# Patient Record
Sex: FEMALE | Race: WHITE | NOT HISPANIC OR LATINO | URBAN - METROPOLITAN AREA
[De-identification: names, ages, dates, MRNs, and addresses within clinical notes are randomized per-mention and may not be internally consistent; named-entity substitution may affect disease eponyms.]

---

## 2019-02-06 ENCOUNTER — TELEPHONE (OUTPATIENT)
Dept: ENDOCRINOLOGY | Facility: CLINIC | Age: 49
End: 2019-02-06

## 2019-02-06 NOTE — TELEPHONE ENCOUNTER
Spoke with pt and advised pt to establish care with a provider in [facility] in order to get referral for endo

## 2019-02-06 NOTE — TELEPHONE ENCOUNTER
----- Message from Tracy Padilla sent at 2/6/2019  9:17 AM CST -----  Contact: pt  She's calling in  Regards to NP appt pls call pt back at [phone]

## 2019-04-26 ENCOUNTER — OFFICE VISIT (OUTPATIENT)
Dept: ENDOCRINOLOGY | Facility: CLINIC | Age: 49
End: 2019-04-26
Payer: COMMERCIAL

## 2019-04-26 VITALS
HEIGHT: 66 IN | BODY MASS INDEX: 26.22 KG/M2 | SYSTOLIC BLOOD PRESSURE: 132 MMHG | WEIGHT: 163.13 LBS | DIASTOLIC BLOOD PRESSURE: 78 MMHG | HEART RATE: 89 BPM

## 2019-04-26 DIAGNOSIS — R73.01 IMPAIRED FASTING GLUCOSE: Primary | ICD-10-CM

## 2019-04-26 DIAGNOSIS — E78.1 HYPERTRIGLYCERIDEMIA: ICD-10-CM

## 2019-04-26 DIAGNOSIS — R63.5 WEIGHT GAIN: ICD-10-CM

## 2019-04-26 PROCEDURE — 99999 PR PBB SHADOW E&M-EST. PATIENT-LVL III: CPT | Mod: PBBFAC,,, | Performed by: INTERNAL MEDICINE

## 2019-04-26 PROCEDURE — 3008F PR BODY MASS INDEX (BMI) DOCUMENTED: ICD-10-PCS | Mod: CPTII,S$GLB,, | Performed by: INTERNAL MEDICINE

## 2019-04-26 PROCEDURE — 99999 PR PBB SHADOW E&M-EST. PATIENT-LVL III: ICD-10-PCS | Mod: PBBFAC,,, | Performed by: INTERNAL MEDICINE

## 2019-04-26 PROCEDURE — 3008F BODY MASS INDEX DOCD: CPT | Mod: CPTII,S$GLB,, | Performed by: INTERNAL MEDICINE

## 2019-04-26 PROCEDURE — 99204 PR OFFICE/OUTPT VISIT, NEW, LEVL IV, 45-59 MIN: ICD-10-PCS | Mod: S$GLB,,, | Performed by: INTERNAL MEDICINE

## 2019-04-26 PROCEDURE — 99204 OFFICE O/P NEW MOD 45 MIN: CPT | Mod: S$GLB,,, | Performed by: INTERNAL MEDICINE

## 2019-04-26 RX ORDER — OMEGA-3 FATTY ACIDS 1000 MG
2 CAPSULE ORAL
COMMUNITY

## 2019-04-26 RX ORDER — METFORMIN HYDROCHLORIDE 500 MG/1
TABLET ORAL
Qty: 30 TABLET | Refills: 1 | Status: SHIPPED | OUTPATIENT
Start: 2019-04-26 | End: 2019-05-16 | Stop reason: SDUPTHER

## 2019-04-26 RX ORDER — MULTIVITAMIN
1 TABLET ORAL
COMMUNITY

## 2019-04-26 RX ORDER — OFLOXACIN 3 MG/ML
SOLUTION AURICULAR (OTIC)
COMMUNITY
Start: 2019-03-11

## 2019-04-26 RX ORDER — PHENTERMINE AND TOPIRAMATE 7.5; 46 MG/1; MG/1
CAPSULE, EXTENDED RELEASE ORAL
Refills: 3 | COMMUNITY
Start: 2019-04-01

## 2019-04-26 RX ORDER — ATORVASTATIN CALCIUM 20 MG/1
TABLET, FILM COATED ORAL
COMMUNITY
Start: 2019-03-19

## 2019-04-26 RX ORDER — PEN NEEDLE, DIABETIC 29 G X1/2"
NEEDLE, DISPOSABLE MISCELLANEOUS
Qty: 50 EACH | Refills: 3 | Status: SHIPPED | OUTPATIENT
Start: 2019-04-26 | End: 2019-08-16 | Stop reason: SDUPTHER

## 2019-04-26 RX ORDER — NYSTATIN AND TRIAMCINOLONE ACETONIDE 100000; 1 [USP'U]/G; MG/G
OINTMENT TOPICAL
COMMUNITY
Start: 2019-03-11

## 2019-04-26 RX ORDER — LEVOTHYROXINE SODIUM 25 UG/1
25 TABLET ORAL DAILY
Qty: 30 TABLET | Refills: 2 | Status: SHIPPED | OUTPATIENT
Start: 2019-04-26 | End: 2019-05-16 | Stop reason: SDUPTHER

## 2019-04-26 RX ORDER — BUPROPION HYDROCHLORIDE 300 MG/1
TABLET ORAL
COMMUNITY
End: 2019-08-16

## 2019-04-26 NOTE — PROGRESS NOTES
PCP:  Devaughn Torre MD    CC:  Weight problem  HPI:  Von Fink 49 y.o. female  Patient was in the past on Synthroid 50 mcg until 6 months ago and she was treated   With Bydureon injection when she was under the care of an endocrinologist .  Last visit was about 6 months ago so patient has been of her medication, Synthroid for about 6 months.  Her weight in the past was 240 lb then her weight was down to 130-140 lb per patient,  and today's the weight is 163 lb.  Patient has been feeling tired.  She visited her cardiologist recently because of the family history of diabetes, and cardiac bypass surgery.  She was prescribed Qsymia about 4 weeks ago and she was advised to follow up with endocrine per patient.  Patient has a history of tubal ligation and she is on birth control pills.  No pregnancy test done recently.  Patient is very eager to lose more weight.  Blood test was done on .  Blood test showed fasting glucose 107, and free T4 of 0.9      Past Medical History:   Diagnosis Date    Chest pain     PND (paroxysmal nocturnal dyspnea)     SOB (shortness of breath)        Past Surgical History:   Procedure Laterality Date     SECTION, CLASSIC  [id number]       Social History     Socioeconomic History    Marital status:      Spouse name: Not on file    Number of children: Not on file    Years of education: Not on file    Highest education level: Not on file   Occupational History    Not on file   Social Needs    Financial resource strain: Not on file    Food insecurity:     Worry: Not on file     Inability: Not on file    Transportation needs:     Medical: Not on file     Non-medical: Not on file   Tobacco Use    Smoking status: Never Smoker   Substance and Sexual Activity    Alcohol use: Yes     Comment: occasional    Drug use: No    Sexual activity: Yes     Partners: Male   Lifestyle    Physical activity:     Days per week: Not on file     Minutes per  session: Not on file    Stress: Not on file   Relationships    Social connections:     Talks on phone: Not on file     Gets together: Not on file     Attends [employer] service: Not on file     Active member of club or organization: Not on file     Attends meetings of clubs or organizations: Not on file     Relationship status: Not on file   Other Topics Concern    Not on file   Social History Narrative    Not on file         ROS:   History of insulin resistance  Weight gain  No diabetes  Status post tubal ligation  ROS otherwise neg except for what is mentioned in the PMH, PSH and HPI    PE:  Vitals:    04/26/19 1601   BP: 132/78   Pulse: 89     Alert and oriented  No acute distress  No cushingoid features in face  No acne  No Proptosis or conjunctivitis  No rash on tongue, +teeth  No goitre by inspection  Thyroid gland is not palpable  No cervical lymphadenopathy  Heart reg, no gallop  Lungs cta, no wheezing  Abd soft, no tnd  No edema in lower legs  No rash  No bruises  Speech normal  Behavior normal  No tremor  Mild Abd Obesity  Body mass index is 26.33 kg/m².      Lab:    Lab Results   Component Value Date    TSH 1.72 04/01/2019    [id number] 7.2 04/01/2019       No components found for: AGBA1C  Lab Results   Component Value Date    CHOL 171 04/01/2019    TRIG 151 (H) 04/01/2019    HDL 72 04/01/2019    CHOLHDL 2.4 04/01/2019    NONHDLCHOL 99 04/01/2019     BMP  Lab Results   Component Value Date     04/01/2019    K 4.4 04/01/2019     04/01/2019    CO2 25 04/01/2019    BUN 10 04/01/2019    CREATININE 0.61 04/01/2019    CALCIUM 8.9 04/01/2019    ANIONGAP 7 (L) 03/06/2012    ESTGFRAFRICA 124 04/01/2019    EGFRNONAA 107 04/01/2019     No results found for: CREATRANDUR, MICALBCREAT    A/P:  Impaired fasting glucose  Insulin resistance  Hypertriglyceridemia  Weight gain  Fatigue  Free T4 was 0.9  Significant family history of cardiac bypass surgery and family history of diabetes  History of treatment with  "Synthroid 50 mcg daily till 6 months ago  History of treatment with Bydureon injection till about 6 months ago    I have discussed with the patient regarding weight management, Qsymia side effects, precautions and the need for monthly pregnancy test.  Contraindications and warning for Saxenda discussed.  For now patient to stop taking Qsymia, and she will be prescribed   Saxenda with starting dose of 0.6 mg daily.  There is no history of pancreatitis or medullary thyroid cancer in the family.  Levothyroxine 25 mcg tablet daily will be prescribed because patient got used to be on 50 mcg dose then she gained weight recently.  Metformin 500 mg tablet daily if tolerated.    -     levothyroxine (SYNTHROID) 25 MCG tablet; Take 1 tablet (25 mcg total) by mouth once daily.  Dispense: 30 tablet; Refill: 2  -     liraglutide (SAXENDA) 3 mg/0.5 mL (18 mg/3 mL) PnIj; 0.6 mg sq daily x 2 weeks then 1.2 mg qd  Dispense: 2 Syringe; Refill: 1  -     pen needle, diabetic 31 gauge x 1/3" Ndle; daily  Dispense: 50 each; Refill: 3  -     metFORMIN (GLUCOPHAGE) 500 MG tablet; qd  Dispense: 30 tablet; Refill: 1      Appt in 6 weeks.    Pt understands the plan and instructions.            "

## 2019-04-30 ENCOUNTER — PATIENT MESSAGE (OUTPATIENT)
Dept: ENDOCRINOLOGY | Facility: CLINIC | Age: 49
End: 2019-04-30

## 2019-05-01 ENCOUNTER — PATIENT MESSAGE (OUTPATIENT)
Dept: ENDOCRINOLOGY | Facility: CLINIC | Age: 49
End: 2019-05-01

## 2019-05-13 ENCOUNTER — PATIENT MESSAGE (OUTPATIENT)
Dept: ENDOCRINOLOGY | Facility: CLINIC | Age: 49
End: 2019-05-13

## 2019-05-16 RX ORDER — LEVOTHYROXINE SODIUM 25 UG/1
25 TABLET ORAL DAILY
Qty: 30 TABLET | Refills: 0 | Status: SHIPPED | OUTPATIENT
Start: 2019-05-16 | End: 2019-05-23

## 2019-05-16 RX ORDER — METFORMIN HYDROCHLORIDE 500 MG/1
TABLET ORAL
Qty: 30 TABLET | Refills: 0 | Status: SHIPPED | OUTPATIENT
Start: 2019-05-16 | End: 2019-05-23 | Stop reason: SDUPTHER

## 2019-05-23 RX ORDER — LEVOTHYROXINE SODIUM 50 UG/1
50 TABLET ORAL DAILY
Qty: 30 TABLET | Refills: 1 | Status: SHIPPED | OUTPATIENT
Start: 2019-05-23 | End: 2019-06-14 | Stop reason: SDUPTHER

## 2019-05-23 RX ORDER — METFORMIN HYDROCHLORIDE 500 MG/1
TABLET ORAL
Qty: 60 TABLET | Refills: 1 | Status: SHIPPED | OUTPATIENT
Start: 2019-05-23 | End: 2019-06-14

## 2019-06-14 ENCOUNTER — OFFICE VISIT (OUTPATIENT)
Dept: ENDOCRINOLOGY | Facility: CLINIC | Age: 49
End: 2019-06-14
Payer: COMMERCIAL

## 2019-06-14 VITALS
BODY MASS INDEX: 26.82 KG/M2 | WEIGHT: 166.88 LBS | SYSTOLIC BLOOD PRESSURE: 120 MMHG | TEMPERATURE: 98 F | DIASTOLIC BLOOD PRESSURE: 68 MMHG | HEIGHT: 66 IN | HEART RATE: 84 BPM

## 2019-06-14 DIAGNOSIS — R73.01 IMPAIRED FASTING GLUCOSE: Primary | ICD-10-CM

## 2019-06-14 DIAGNOSIS — R63.5 WEIGHT GAIN: ICD-10-CM

## 2019-06-14 PROCEDURE — 3008F BODY MASS INDEX DOCD: CPT | Mod: CPTII,S$GLB,, | Performed by: INTERNAL MEDICINE

## 2019-06-14 PROCEDURE — 3008F PR BODY MASS INDEX (BMI) DOCUMENTED: ICD-10-PCS | Mod: CPTII,S$GLB,, | Performed by: INTERNAL MEDICINE

## 2019-06-14 PROCEDURE — 99999 PR PBB SHADOW E&M-EST. PATIENT-LVL III: ICD-10-PCS | Mod: PBBFAC,,, | Performed by: INTERNAL MEDICINE

## 2019-06-14 PROCEDURE — 99214 PR OFFICE/OUTPT VISIT, EST, LEVL IV, 30-39 MIN: ICD-10-PCS | Mod: S$GLB,,, | Performed by: INTERNAL MEDICINE

## 2019-06-14 PROCEDURE — 99999 PR PBB SHADOW E&M-EST. PATIENT-LVL III: CPT | Mod: PBBFAC,,, | Performed by: INTERNAL MEDICINE

## 2019-06-14 PROCEDURE — 99214 OFFICE O/P EST MOD 30 MIN: CPT | Mod: S$GLB,,, | Performed by: INTERNAL MEDICINE

## 2019-06-14 RX ORDER — METFORMIN HYDROCHLORIDE 500 MG/1
TABLET ORAL
Qty: 30 TABLET | Refills: 1 | Status: SHIPPED | OUTPATIENT
Start: 2019-06-14 | End: 2019-06-14 | Stop reason: SDUPTHER

## 2019-06-14 RX ORDER — LEVOTHYROXINE SODIUM 50 UG/1
50 TABLET ORAL DAILY
Qty: 30 TABLET | Refills: 3 | Status: SHIPPED | OUTPATIENT
Start: 2019-06-14 | End: 2020-06-13

## 2019-06-14 RX ORDER — LEVOTHYROXINE SODIUM 50 UG/1
50 TABLET ORAL DAILY
Qty: 30 TABLET | Refills: 2 | Status: SHIPPED | OUTPATIENT
Start: 2019-06-14 | End: 2019-06-14

## 2019-06-14 RX ORDER — METFORMIN HYDROCHLORIDE 500 MG/1
TABLET ORAL
Qty: 60 TABLET | Refills: 2 | Status: SHIPPED | OUTPATIENT
Start: 2019-06-14

## 2019-06-14 NOTE — PROGRESS NOTES
PCP:  Devaughn Torre MD    CC:  Weight problem    HPI:  Von Fink 49 y.o. female  Patient was in the past on Synthroid 50 mcg until 6 months ago and she was treated   with Bydureon injection when she was under the care of an endocrinologist .  Patient was prescribed metformin 500 mg tablet to take once a day, and levothyroxine 25 mcg then the dose was increased to 50 mcg,  And Victoza.  Patient has been taking the prescribed medications.  No upset stomach or nausea or vomiting from taking Victoza.  Her weight in the past was 240 lb then her weight was down to 130-140 lb per patient,  and today's the weight is 166 lb.     She visited her cardiologist recently because of the family history of diabetes, and cardiac bypass surgery.  She was prescribed Qsymia couple months ago and she was advised to follow up with endocrine per patient.  Not on Qsymia.  Patient has a history of tubal ligation and she is on birth control pills.  No pregnancy test done recently.  Patient is very eager to lose more weight.  Blood test was done on .  Blood test showed fasting glucose 107, and free T4 of 0.9      Past Medical History:   Diagnosis Date    Chest pain     PND (paroxysmal nocturnal dyspnea)     SOB (shortness of breath)        Past Surgical History:   Procedure Laterality Date     SECTION, CLASSIC  [id number]       Social History     Socioeconomic History    Marital status:      Spouse name: Not on file    Number of children: Not on file    Years of education: Not on file    Highest education level: Not on file   Occupational History    Not on file   Social Needs    Financial resource strain: Not on file    Food insecurity:     Worry: Not on file     Inability: Not on file    Transportation needs:     Medical: Not on file     Non-medical: Not on file   Tobacco Use    Smoking status: Never Smoker   Substance and Sexual Activity    Alcohol use: Yes     Comment: occasional     Drug use: No    Sexual activity: Yes     Partners: Male   Lifestyle    Physical activity:     Days per week: Not on file     Minutes per session: Not on file    Stress: Not on file   Relationships    Social connections:     Talks on phone: Not on file     Gets together: Not on file     Attends Jainism service: Not on file     Active member of club or organization: Not on file     Attends meetings of clubs or organizations: Not on file     Relationship status: Not on file   Other Topics Concern    Not on file   Social History Narrative    Not on file         ROS:   History of insulin resistance  Weight gain  No diabetes  Status post tubal ligation  Inability to exercise in the last couple weeks secondary to  Having surgery on ears; patient will get clearance from her cardiologist to restart doing exercise  No complaints of nausea or vomiting  No complaints of chest pain or shortness of breath  ROS otherwise neg except for what is mentioned in the PMH, PSH and HPI    PE:  Vitals:    06/14/19 1559   BP: 120/68   Temp: 98.1 °F (36.7 °C)     Alert and oriented  No acute distress  No cushingoid features in face  No acne  No Proptosis or conjunctivitis  No goitre by inspection  Thyroid gland is not palpable  Heart reg, no gallop  Lungs cta, no wheezing  No edema in lower legs  No rash  No bruises  Speech normal  Behavior normal  No tremor  Mild Abd Obesity  Body mass index is 26.94 kg/m².      Lab:    Lab Results   Component Value Date    TSH 1.72 04/01/2019    [id number] 7.2 04/01/2019       No components found for: AGBA1C  Lab Results   Component Value Date    CHOL 171 04/01/2019    TRIG 151 (H) 04/01/2019    HDL 72 04/01/2019    CHOLHDL 2.4 04/01/2019    NONHDLCHOL 99 04/01/2019     BMP  Lab Results   Component Value Date     04/01/2019    K 4.4 04/01/2019     04/01/2019    CO2 25 04/01/2019    BUN 10 04/01/2019    CREATININE 0.61 04/01/2019    CALCIUM 8.9 04/01/2019    ANIONGAP 7 (L) 03/06/2012     ESTGFRAFRICA 124 04/01/2019    EGFRNONAA 107 04/01/2019     No results found for: CREATRANDUR, MICALBCREAT    A/P:  Impaired fasting glucose  Insulin resistance  Hypertriglyceridemia  Weight gain  Fatigue  Free T4 was 0.9  Significant family history of cardiac bypass surgery and family history of diabetes    To continue taking levothyroxine 50 mcg daily  Metformin dose to be increased to 500 mg twice a day  Continue on Victoza  (warning/contraindications discussed)  Possibility of mild increase in heart rate discussed    Orders Placed This Encounter   Procedures    T4, free     Standing Status:   Future     Standing Expiration Date:   9/14/2019    TSH     Standing Status:   Future     Standing Expiration Date:   9/14/2019    Comprehensive metabolic panel     Standing Status:   Future     Standing Expiration Date:   8/14/2020    Hemoglobin A1c     Standing Status:   Future     Standing Expiration Date:   12/14/2019       Appt in 8 weeks.    Pt understands the plan and instructions.

## 2019-07-30 RX ORDER — METFORMIN HYDROCHLORIDE 500 MG/1
TABLET ORAL
Qty: 60 TABLET | Refills: 0 | Status: SHIPPED | OUTPATIENT
Start: 2019-07-30 | End: 2019-08-10 | Stop reason: SDUPTHER

## 2019-08-11 ENCOUNTER — PATIENT MESSAGE (OUTPATIENT)
Dept: ENDOCRINOLOGY | Facility: CLINIC | Age: 49
End: 2019-08-11

## 2019-08-12 RX ORDER — METFORMIN HYDROCHLORIDE 500 MG/1
TABLET ORAL
Qty: 60 TABLET | Refills: 0 | Status: SHIPPED | OUTPATIENT
Start: 2019-08-12 | End: 2019-10-16 | Stop reason: SDUPTHER

## 2019-08-16 ENCOUNTER — LAB VISIT (OUTPATIENT)
Dept: LAB | Facility: HOSPITAL | Age: 49
End: 2019-08-16
Attending: INTERNAL MEDICINE
Payer: COMMERCIAL

## 2019-08-16 ENCOUNTER — OFFICE VISIT (OUTPATIENT)
Dept: ENDOCRINOLOGY | Facility: CLINIC | Age: 49
End: 2019-08-16
Payer: COMMERCIAL

## 2019-08-16 VITALS
SYSTOLIC BLOOD PRESSURE: 121 MMHG | HEIGHT: 66 IN | WEIGHT: 168.63 LBS | DIASTOLIC BLOOD PRESSURE: 74 MMHG | HEART RATE: 81 BPM | BODY MASS INDEX: 27.1 KG/M2 | OXYGEN SATURATION: 98 % | TEMPERATURE: 99 F

## 2019-08-16 DIAGNOSIS — R73.01 IMPAIRED FASTING GLUCOSE: ICD-10-CM

## 2019-08-16 DIAGNOSIS — R63.5 WEIGHT GAIN: ICD-10-CM

## 2019-08-16 DIAGNOSIS — R73.01 IMPAIRED FASTING GLUCOSE: Primary | ICD-10-CM

## 2019-08-16 DIAGNOSIS — E78.1 HYPERTRIGLYCERIDEMIA: ICD-10-CM

## 2019-08-16 PROCEDURE — 99214 OFFICE O/P EST MOD 30 MIN: CPT | Mod: S$GLB,,, | Performed by: INTERNAL MEDICINE

## 2019-08-16 PROCEDURE — 84439 ASSAY OF FREE THYROXINE: CPT

## 2019-08-16 PROCEDURE — 80053 COMPREHEN METABOLIC PANEL: CPT

## 2019-08-16 PROCEDURE — 3008F BODY MASS INDEX DOCD: CPT | Mod: CPTII,S$GLB,, | Performed by: INTERNAL MEDICINE

## 2019-08-16 PROCEDURE — 83036 HEMOGLOBIN GLYCOSYLATED A1C: CPT

## 2019-08-16 PROCEDURE — 99999 PR PBB SHADOW E&M-EST. PATIENT-LVL III: ICD-10-PCS | Mod: PBBFAC,,, | Performed by: INTERNAL MEDICINE

## 2019-08-16 PROCEDURE — 36415 COLL VENOUS BLD VENIPUNCTURE: CPT

## 2019-08-16 PROCEDURE — 99214 PR OFFICE/OUTPT VISIT, EST, LEVL IV, 30-39 MIN: ICD-10-PCS | Mod: S$GLB,,, | Performed by: INTERNAL MEDICINE

## 2019-08-16 PROCEDURE — 3008F PR BODY MASS INDEX (BMI) DOCUMENTED: ICD-10-PCS | Mod: CPTII,S$GLB,, | Performed by: INTERNAL MEDICINE

## 2019-08-16 PROCEDURE — 99999 PR PBB SHADOW E&M-EST. PATIENT-LVL III: CPT | Mod: PBBFAC,,, | Performed by: INTERNAL MEDICINE

## 2019-08-16 PROCEDURE — 84443 ASSAY THYROID STIM HORMONE: CPT

## 2019-08-16 RX ORDER — PEN NEEDLE, DIABETIC 29 G X1/2"
NEEDLE, DISPOSABLE MISCELLANEOUS
Qty: 50 EACH | Refills: 3 | Status: SHIPPED | OUTPATIENT
Start: 2019-08-16 | End: 2019-08-23 | Stop reason: SDUPTHER

## 2019-08-16 NOTE — PROGRESS NOTES
PCP:  Devaughn Torre MD    CC:  Weight problem    HPI:  Von Fink 49 y.o. female  Patient was in the past on Synthroid 50 mcg until 6 months ago and she was treated   with Bydureon injection when she was under the care of an endocrinologist .  Patient was prescribed metformin 500 mg tablet to take once a day, and levothyroxine 25 mcg then the dose was increased to 50 mcg,  And Victoza.  Currently she is on metformin, Victoza 1.2 mg daily, and levothyroxine 50 mcg daily.  Her weight in the past was 240 lb then her weight was down to 130-140 lb per patient,  and today's the weight is 168.  No weight loss recently.     She visited her cardiologist recently because of the family history of diabetes, and cardiac bypass surgery.  She is scheduled to have treadmill stress test on Monday.  She was prescribed Qsymia couple months ago and she was advised to follow up with endocrine per patient.  Not on Qsymia.  Patient has a history of tubal ligation and she is on birth control pills.  No pregnancy test done recently.  Patient is very eager to lose more weight.  Blood test was done on .  Blood test showed fasting glucose 107, and free T4 of 0.9      Past Medical History:   Diagnosis Date    Chest pain     PND (paroxysmal nocturnal dyspnea)     SOB (shortness of breath)        Past Surgical History:   Procedure Laterality Date     SECTION, CLASSIC  [id number]       Social History     Socioeconomic History    Marital status:      Spouse name: Not on file    Number of children: Not on file    Years of education: Not on file    Highest education level: Not on file   Occupational History    Not on file   Social Needs    Financial resource strain: Not on file    Food insecurity:     Worry: Not on file     Inability: Not on file    Transportation needs:     Medical: Not on file     Non-medical: Not on file   Tobacco Use    Smoking status: Never Smoker   Substance and Sexual  Activity    Alcohol use: Yes     Comment: occasional    Drug use: No    Sexual activity: Yes     Partners: Male   Lifestyle    Physical activity:     Days per week: Not on file     Minutes per session: Not on file    Stress: Not on file   Relationships    Social connections:     Talks on phone: Not on file     Gets together: Not on file     Attends [employer] service: Not on file     Active member of club or organization: Not on file     Attends meetings of clubs or organizations: Not on file     Relationship status: Not on file   Other Topics Concern    Not on file   Social History Narrative    Not on file         ROS:   History of insulin resistance  No weight loss recently  No diabetes  Status post tubal ligation  No complaints of nausea or vomiting  No complaints of chest pain or shortness of breath    PE:  Vitals:    08/16/19 1640   BP: 121/74   Pulse: 81   Temp: 98.7 °F (37.1 °C)     Alert and oriented  No acute distress  No cushingoid features in face  No Proptosis or conjunctivitis  No goitre by inspection  Thyroid gland is not palpable  Heart reg, no gallop  Lungs cta, no wheezing  No edema in lower legs  Speech normal  Behavior normal  No tremor  Mild Abd Obesity  Body mass index is 27.22 kg/m².      Lab:    Lab Results   Component Value Date    TSH 1.72 04/01/2019    [id number] 7.2 04/01/2019       No components found for: AGBA1C  Lab Results   Component Value Date    CHOL 171 04/01/2019    TRIG 151 (H) 04/01/2019    HDL 72 04/01/2019    CHOLHDL 2.4 04/01/2019    NONHDLCHOL 99 04/01/2019     BMP  Lab Results   Component Value Date     04/01/2019    K 4.4 04/01/2019     04/01/2019    CO2 25 04/01/2019    BUN 10 04/01/2019    CREATININE 0.61 04/01/2019    CALCIUM 8.9 04/01/2019    ANIONGAP 7 (L) 03/06/2012    ESTGFRAFRICA 124 04/01/2019    EGFRNONAA 107 04/01/2019       A/P:  Impaired fasting glucose  Insulin resistance  Hypertriglyceridemia  Weight gain  Fatigue  Free T4 was  0.9  Significant family history of cardiac bypass surgery and family history of diabetes    To continue taking levothyroxine 50 mcg daily  Metformin dose  500 mg twice a day  Continue on Victoza .  The dose will be increased to 1.8 mg daily  Blood test was done today and the result is not back yet.    Appt in 2 months..    Pt understands the plan and instructions.

## 2019-08-17 LAB
ALBUMIN SERPL BCP-MCNC: 3.9 G/DL (ref 3.5–5.2)
ALP SERPL-CCNC: 56 U/L (ref 55–135)
ALT SERPL W/O P-5'-P-CCNC: 14 U/L (ref 10–44)
ANION GAP SERPL CALC-SCNC: 8 MMOL/L (ref 8–16)
AST SERPL-CCNC: 14 U/L (ref 10–40)
BILIRUB SERPL-MCNC: 0.3 MG/DL (ref 0.1–1)
BUN SERPL-MCNC: 13 MG/DL (ref 6–20)
CALCIUM SERPL-MCNC: 8.8 MG/DL (ref 8.7–10.5)
CHLORIDE SERPL-SCNC: 110 MMOL/L (ref 95–110)
CO2 SERPL-SCNC: 20 MMOL/L (ref 23–29)
CREAT SERPL-MCNC: 0.8 MG/DL (ref 0.5–1.4)
EST. GFR  (AFRICAN AMERICAN): >60 ML/MIN/1.73 M^2
EST. GFR  (NON AFRICAN AMERICAN): >60 ML/MIN/1.73 M^2
ESTIMATED AVG GLUCOSE: 88 MG/DL (ref 68–131)
GLUCOSE SERPL-MCNC: 68 MG/DL (ref 70–110)
HBA1C MFR BLD HPLC: 4.7 % (ref 4–5.6)
POTASSIUM SERPL-SCNC: 4.1 MMOL/L (ref 3.5–5.1)
PROT SERPL-MCNC: 6.2 G/DL (ref 6–8.4)
SODIUM SERPL-SCNC: 138 MMOL/L (ref 136–145)
T4 FREE SERPL-MCNC: 0.86 NG/DL (ref 0.71–1.51)
TSH SERPL DL<=0.005 MIU/L-ACNC: 0.97 UIU/ML (ref 0.4–4)

## 2019-08-26 RX ORDER — PEN NEEDLE, DIABETIC 29 G X1/2"
NEEDLE, DISPOSABLE MISCELLANEOUS
Qty: 50 EACH | Refills: 3 | Status: SHIPPED | OUTPATIENT
Start: 2019-08-26

## 2019-10-16 RX ORDER — METFORMIN HYDROCHLORIDE 500 MG/1
TABLET ORAL
Qty: 60 TABLET | Refills: 0 | Status: SHIPPED | OUTPATIENT
Start: 2019-10-16

## 2019-10-18 ENCOUNTER — OFFICE VISIT (OUTPATIENT)
Dept: ENDOCRINOLOGY | Facility: CLINIC | Age: 49
End: 2019-10-18
Payer: COMMERCIAL

## 2019-10-18 VITALS
SYSTOLIC BLOOD PRESSURE: 127 MMHG | DIASTOLIC BLOOD PRESSURE: 75 MMHG | WEIGHT: 171.31 LBS | HEIGHT: 65 IN | BODY MASS INDEX: 28.54 KG/M2 | HEART RATE: 88 BPM

## 2019-10-18 DIAGNOSIS — E03.9 HYPOTHYROIDISM, UNSPECIFIED TYPE: Primary | ICD-10-CM

## 2019-10-18 DIAGNOSIS — R73.01 IMPAIRED FASTING GLUCOSE: ICD-10-CM

## 2019-10-18 DIAGNOSIS — R63.5 WEIGHT GAIN: ICD-10-CM

## 2019-10-18 PROCEDURE — 3008F BODY MASS INDEX DOCD: CPT | Mod: CPTII,S$GLB,, | Performed by: INTERNAL MEDICINE

## 2019-10-18 PROCEDURE — 99999 PR PBB SHADOW E&M-EST. PATIENT-LVL III: CPT | Mod: PBBFAC,,, | Performed by: INTERNAL MEDICINE

## 2019-10-18 PROCEDURE — 99214 PR OFFICE/OUTPT VISIT, EST, LEVL IV, 30-39 MIN: ICD-10-PCS | Mod: S$GLB,,, | Performed by: INTERNAL MEDICINE

## 2019-10-18 PROCEDURE — 3008F PR BODY MASS INDEX (BMI) DOCUMENTED: ICD-10-PCS | Mod: CPTII,S$GLB,, | Performed by: INTERNAL MEDICINE

## 2019-10-18 PROCEDURE — 99999 PR PBB SHADOW E&M-EST. PATIENT-LVL III: ICD-10-PCS | Mod: PBBFAC,,, | Performed by: INTERNAL MEDICINE

## 2019-10-18 PROCEDURE — 99214 OFFICE O/P EST MOD 30 MIN: CPT | Mod: S$GLB,,, | Performed by: INTERNAL MEDICINE

## 2019-10-18 RX ORDER — LEVOTHYROXINE SODIUM 25 UG/1
TABLET ORAL
Qty: 15 TABLET | Refills: 2 | Status: SHIPPED | OUTPATIENT
Start: 2019-10-18 | End: 2020-01-14

## 2019-10-18 RX ORDER — PHENTERMINE HYDROCHLORIDE 15 MG/1
15 CAPSULE ORAL EVERY MORNING
COMMUNITY

## 2019-10-18 RX ORDER — TOPIRAMATE 50 MG/1
50 TABLET, FILM COATED ORAL DAILY
COMMUNITY

## 2019-10-18 RX ORDER — LEVOTHYROXINE SODIUM 25 UG/1
TABLET ORAL
Qty: 30 TABLET | Refills: 2 | Status: SHIPPED | OUTPATIENT
Start: 2019-10-18 | End: 2019-10-18

## 2019-12-26 ENCOUNTER — TELEPHONE (OUTPATIENT)
Dept: OTOLARYNGOLOGY | Facility: CLINIC | Age: 49
End: 2019-12-26

## 2019-12-26 NOTE — TELEPHONE ENCOUNTER
Pt has been schedule to see the provider as requested and stated that she has to speak with her work to make sure that she can take off a little early if not she will call to reschedule.

## 2020-01-14 RX ORDER — LEVOTHYROXINE SODIUM 25 UG/1
TABLET ORAL
Qty: 15 TABLET | Refills: 0 | Status: SHIPPED | OUTPATIENT
Start: 2020-01-14

## 2021-03-14 ENCOUNTER — IMMUNIZATION (OUTPATIENT)
Dept: INTERNAL MEDICINE | Facility: CLINIC | Age: 51
End: 2021-03-14
Payer: COMMERCIAL

## 2021-03-14 DIAGNOSIS — Z23 NEED FOR VACCINATION: Primary | ICD-10-CM

## 2021-03-14 PROCEDURE — 0031A COVID-19,VECTOR-NR,RS-AD26,PF,0.5 ML DOSE VACCINE (JANSSEN): CPT | Mod: PBBFAC | Performed by: FAMILY MEDICINE

## 2023-04-19 NOTE — PROGRESS NOTES
PCP:  Devaughn Torre MD    CC:  Weight problem    HPI:  Von LYON Danae 49 y.o. female  Pt was treated with Bydureon injection when she was under the care of an endocrinologist .  Currently she is on metformin, Victoza 1.8 mg daily, and levothyroxine 50 mcg daily  Her weight in the past was 240 lb then her weight was down to 130-140 lb per patient,  and today's the weight is 17.  Patient's cardiologist prescribed phentermine 15 mg and Topiramate 50 mg tablet recently.  No weight loss recently.     She visited her cardiologist recently because of the family history of diabetes, and cardiac bypass surgery.  Treadmill test was fine recently per patient  She was prescribed Qsymia couple months ago and she was advised to follow up with endocrine per patient.  Patient has a history of tubal ligation and she is on birth control pills.  Patient is very eager to lose more weight.  Blood test was done on .  Blood test showed fasting glucose 107, and free T4 of 0.9      Past Medical History:   Diagnosis Date    Chest pain     PND (paroxysmal nocturnal dyspnea)     SOB (shortness of breath)        Past Surgical History:   Procedure Laterality Date     SECTION, CLASSIC  [id number]       Social History     Socioeconomic History    Marital status:      Spouse name: Not on file    Number of children: Not on file    Years of education: Not on file    Highest education level: Not on file   Occupational History    Not on file   Social Needs    Financial resource strain: Not on file    Food insecurity:     Worry: Not on file     Inability: Not on file    Transportation needs:     Medical: Not on file     Non-medical: Not on file   Tobacco Use    Smoking status: Never Smoker   Substance and Sexual Activity    Alcohol use: Yes     Comment: occasional    Drug use: No    Sexual activity: Yes     Partners: Male   Lifestyle    Physical activity:     Days per week: Not on file      Minutes per session: Not on file    Stress: Not on file   Relationships    Social connections:     Talks on phone: Not on file     Gets together: Not on file     Attends Anabaptist service: Not on file     Active member of club or organization: Not on file     Attends meetings of clubs or organizations: Not on file     Relationship status: Not on file   Other Topics Concern    Not on file   Social History Narrative    Not on file         ROS:   History of insulin resistance  No weight loss recently  No diabetes  Status post tubal ligation  No complaints of nausea or vomiting  No complaints of chest pain or shortness of breath  No complaints of tachycardia or palpitations    PE:  Vitals:    10/18/19 1556   BP: 127/75     Alert and oriented  No acute distress  No cushingoid features in face  No Proptosis or conjunctivitis  No goitre by inspection  Thyroid gland is not palpable  Heart reg, no gallop  Lungs cta, no wheezing  No edema in lower legs  Speech normal  Behavior normal  No tremor  Mild Abd Obesity  Body mass index is 28.51 kg/m².      Lab:    Lab Results   Component Value Date    TSH 0.966 08/16/2019    [id number] 7.2 04/01/2019    FREET4 0.86 08/16/2019       No components found for: AGBA1C  Lab Results   Component Value Date    CHOL 171 04/01/2019    TRIG 151 (H) 04/01/2019    HDL 72 04/01/2019    CHOLHDL 2.4 04/01/2019    NONHDLCHOL 99 04/01/2019     BMP  Lab Results   Component Value Date     08/16/2019    K 4.1 08/16/2019     08/16/2019    CO2 20 (L) 08/16/2019    BUN 13 08/16/2019    CREATININE 0.8 08/16/2019    CALCIUM 8.8 08/16/2019    ANIONGAP 8 08/16/2019    ESTGFRAFRICA >60.0 08/16/2019    EGFRNONAA >60.0 08/16/2019       A/P:  Impaired fasting glucose  Insulin resistance  Hypertriglyceridemia  Weight gain  Significant family history of cardiac bypass surgery and family history of diabetes  To continue taking levothyroxine 50 mcg daily  Levothyroxine half of 25 mcg tablet will be added  so the total dose of levothyroxine will be 62.5 mcg daily  Metformin dose  500 mg twice a day  Ozempiq once a week will replace Victoza because patient has not lost weight on Victoza recently  Contraindications, precautions including worsening of retinopathy in diabetes discussed  Off label use of Ozempiq discussed  Patient is advised to monitor her pulse daily.  Side effect of tachycardia  secondary to  medications discussed.    Orders Placed This Encounter   Procedures    TSH     Standing Status:   Future     Standing Expiration Date:   4/18/2020    T4, free     Standing Status:   Future     Standing Expiration Date:   4/18/2020         Appt in one month.    Pt understands the plan and instructions.             Erythromycin Pregnancy And Lactation Text: This medication is Pregnancy Category B and is considered safe during pregnancy. It is also excreted in breast milk.